# Patient Record
Sex: FEMALE | Race: WHITE | HISPANIC OR LATINO | Employment: STUDENT | URBAN - NONMETROPOLITAN AREA
[De-identification: names, ages, dates, MRNs, and addresses within clinical notes are randomized per-mention and may not be internally consistent; named-entity substitution may affect disease eponyms.]

---
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
## 2021-03-12 ENCOUNTER — OFFICE VISIT (OUTPATIENT)
Dept: OBSTETRICS AND GYNECOLOGY | Facility: CLINIC | Age: 21
End: 2021-03-12

## 2021-03-12 VITALS
SYSTOLIC BLOOD PRESSURE: 128 MMHG | DIASTOLIC BLOOD PRESSURE: 76 MMHG | BODY MASS INDEX: 42.91 KG/M2 | WEIGHT: 267 LBS | HEIGHT: 66 IN

## 2021-03-12 DIAGNOSIS — N60.19 FIBROCYSTIC BREAST DISEASE (FCBD), UNSPECIFIED LATERALITY: ICD-10-CM

## 2021-03-12 DIAGNOSIS — N64.4 PAIN OF BOTH BREASTS: ICD-10-CM

## 2021-03-12 DIAGNOSIS — N63.0 BREAST NODULE: Primary | ICD-10-CM

## 2021-03-12 PROCEDURE — 99214 OFFICE O/P EST MOD 30 MIN: CPT | Performed by: OBSTETRICS & GYNECOLOGY

## 2021-03-12 RX ORDER — LISINOPRIL 5 MG/1
5 TABLET ORAL DAILY
COMMUNITY
Start: 2020-12-28

## 2021-03-12 RX ORDER — MIRTAZAPINE 45 MG/1
45 TABLET, FILM COATED ORAL DAILY
COMMUNITY
Start: 2021-02-22 | End: 2021-09-16

## 2021-03-14 NOTE — PROGRESS NOTES
"Chief Complaint  Breast Problem (Patient complains of pain and lump in left breast x 2 weeks. )     History of Present Illness:  Patient is 21 y.o.  who presents to [facility] for evaluation of a painful lesion in her left breast.  Patient reports this is been present for the last 2 weeks.  Patient reports it has not changed in size.  Patient does report having pain and tenderness in her right breast as well.  Patient denies any discharge or leaking from her nipples.  Patient denies any caffeine intake.  Patient does have chocolate in her diet.  Patient did have similar complaints in 2019.  Patient had a left breast ultrasound at that time.  Patient denies any changes in her health or medications otherwise.  Patient does have Kyleena IUD.    Physical Examination:  Vital Signs: /76   Ht 167.6 cm (66\")   Wt 121 kg (267 lb)   BMI 43.09 kg/m²     General Appearance: alert, appears stated age, and cooperative  Breasts: Examined in supine position  Symmetric with small approximately 1 cm mobile nodule at the 1 o'clock position in the left breast with mild tenderness; no skin dimpling  Nipples normal without inversion, lesions or discharge  There are no palpable axillary nodes  Fibrocystic changes are present both breasts  Abdomen: no masses, no hepatomegaly, no splenomegaly, soft non-tender, no guarding and no rebound tenderness  Pelvic: Not performed.    Data Review:      Labs:    Imaging:  The ultrasound was reviewed and agree with interpretation of test as noted.  US Breast Left Complete (2019 13:02)    Medical Records:  None    Assessment and Plan   Problem List Items Addressed This Visit     None      Visit Diagnoses     Breast nodule    -  Primary  Patient with small nodule of the left breast as noted.  We will schedule breast ultrasound as noted.  Pending results of her breast ultrasound patient may need referral to general surgeon.    Relevant Orders    US " Breast Bilateral Complete    Pain of both breasts      Patient with mastodynia of both breast.  We will schedule bilateral breast ultrasound.  Instructions and precautions are given.  Patient is to call for results.    Relevant Orders    US Breast Bilateral Complete    Fibrocystic breast disease (FCBD), unspecified laterality      I reviewed with Jazzmine that caffeine reduction may help reduce breast pains associated with fibrocystic change.  Furthermore I explained that vitamin E  400 units twice daily may help to lessen the associated breast pains.  If she notices a discretely palpable lump or bloody nipple discharge associated with her breast pain, we need to be notified so that further testing can be scheduled.    Relevant Orders    US Breast Bilateral Complete          Follow Up/Instructions:    Patient was given instructions and counseling regarding her condition or for health maintenance advice. Please see specific information pulled into the AVS if appropriate.     Note: Speech recognition transcription software may have been used to dictate portions of this document.  An attempt at proofreading has been made though minor errors in transcription may still be present.    This note was electronically signed.  Lindsay Muro M.D.

## 2021-03-26 ENCOUNTER — HOSPITAL ENCOUNTER (OUTPATIENT)
Dept: ULTRASOUND IMAGING | Facility: HOSPITAL | Age: 21
Discharge: HOME | End: 2021-03-26
Admitting: OBSTETRICS & GYNECOLOGY

## 2021-03-26 DIAGNOSIS — N64.4 PAIN OF BOTH BREASTS: ICD-10-CM

## 2021-03-26 DIAGNOSIS — N60.19 FIBROCYSTIC BREAST DISEASE (FCBD), UNSPECIFIED LATERALITY: ICD-10-CM

## 2021-03-26 DIAGNOSIS — N63.0 BREAST NODULE: ICD-10-CM

## 2021-03-26 PROCEDURE — 76641 ULTRASOUND BREAST COMPLETE: CPT

## 2021-09-16 ENCOUNTER — OFFICE VISIT (OUTPATIENT)
Dept: PSYCHIATRY | Facility: CLINIC | Age: 21
End: 2021-09-16

## 2021-09-16 VITALS
SYSTOLIC BLOOD PRESSURE: 142 MMHG | HEIGHT: 66 IN | WEIGHT: 250.5 LBS | HEART RATE: 75 BPM | DIASTOLIC BLOOD PRESSURE: 88 MMHG | BODY MASS INDEX: 40.26 KG/M2

## 2021-09-16 DIAGNOSIS — Z91.89 AT HIGH RISK FOR SELF HARM: ICD-10-CM

## 2021-09-16 DIAGNOSIS — F41.1 GAD (GENERALIZED ANXIETY DISORDER): ICD-10-CM

## 2021-09-16 DIAGNOSIS — F33.1 MAJOR DEPRESSIVE DISORDER, RECURRENT EPISODE, MODERATE (HCC): Primary | ICD-10-CM

## 2021-09-16 PROCEDURE — 90792 PSYCH DIAG EVAL W/MED SRVCS: CPT | Performed by: NURSE PRACTITIONER

## 2021-09-16 RX ORDER — CETIRIZINE HYDROCHLORIDE 10 MG/1
10 TABLET ORAL DAILY
COMMUNITY

## 2021-09-16 RX ORDER — VITAMIN E 268 MG
400 CAPSULE ORAL DAILY
COMMUNITY

## 2021-09-16 RX ORDER — BUPROPION HYDROCHLORIDE 150 MG/1
150 TABLET ORAL DAILY
Qty: 30 TABLET | Refills: 1 | Status: SHIPPED | OUTPATIENT
Start: 2021-09-16 | End: 2021-11-15 | Stop reason: SINTOL

## 2021-09-16 RX ORDER — CLONAZEPAM 0.5 MG/1
0.5 TABLET ORAL 2 TIMES DAILY PRN
COMMUNITY
Start: 2021-07-28 | End: 2021-11-15
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
## 2021-11-15 ENCOUNTER — OFFICE VISIT (OUTPATIENT)
Dept: PSYCHIATRY | Facility: CLINIC | Age: 21
End: 2021-11-15

## 2021-11-15 VITALS
DIASTOLIC BLOOD PRESSURE: 84 MMHG | SYSTOLIC BLOOD PRESSURE: 128 MMHG | HEIGHT: 66 IN | WEIGHT: 254 LBS | HEART RATE: 81 BPM | BODY MASS INDEX: 40.82 KG/M2

## 2021-11-15 DIAGNOSIS — Z91.89 AT HIGH RISK FOR SELF HARM: ICD-10-CM

## 2021-11-15 DIAGNOSIS — F39 UNSPECIFIED MOOD (AFFECTIVE) DISORDER (HCC): Primary | ICD-10-CM

## 2021-11-15 DIAGNOSIS — F41.1 GAD (GENERALIZED ANXIETY DISORDER): ICD-10-CM

## 2021-11-15 PROCEDURE — 99214 OFFICE O/P EST MOD 30 MIN: CPT | Performed by: NURSE PRACTITIONER

## 2021-11-15 RX ORDER — LAMOTRIGINE 25 MG/1
TABLET ORAL
Qty: 42 TABLET | Refills: 0 | Status: SHIPPED | OUTPATIENT
Start: 2021-11-15 | End: 2021-12-13

## 2021-11-15 NOTE — PROGRESS NOTES
"Chief Complaint  Unspecified mood, anxiety, and high risk for self harm    Subjective          Jazzmine Green presents to [facility] by herself for a follow up and medication check.    History of Present Illness: Jazzmine states, \"I had thoughts of self-harm a few days of starting Wellbutrin.\"  Jazzmine tells me that she stopped the medication but \"relapsed\" a few days later by cutting her arm.  She reports feeling frustrated at the time.  A few weeks later, Jazzmine tells me that she cut her wrist.  She states, \"it was not bad.\"  She reports having feelings of being numb when she has thoughts of self-harm.  She is more likely to engage in self-harm during her depressive episodes.  She tells me that she has had frequent mood lability within the last several months.  She tells me that, at times, she will feel depressed and hopeless with poor motivation, fatigue, and irritability/anger.  At other times, she tells me she will feel excessive energy, more motivation, decreased need for sleep, and racing thoughts.  She tells me that her moods fluctuate about every 2 weeks.  She states, \"sometimes it is like flipping a switch and then I am fine.\"  She tells me that her mood often stays elevated for anywhere from 1 to 4 days.  She tells me that her coworkers will often noticed when she has an inflated mood.  She reports sleeping about 8 hours nightly.  She continues to work with Cherelle Castellanos on \"self acceptance.\"  She tells me that she feels therapy is beneficial for her mood but she continues to experience anxiety. She tells me she will have \"high anxiety\" followed by feelings of self-doubt which makes her \"angry\" at herself and then she engages in cutting. She tells me it is difficult to consider alternatives to self-harm during these times.  He believes that she has tried Wellbutrin, sertraline, clonazepam, and mirtazapine in the past.  She is not currently taking any psychiatric " "medications as she stopped both Wellbutrin and clonazepam.  She reports having a decreased appetite after having several weeks of \"binge eating.\"  She denies any SI/HI/AVH.    Current Medications:   Current Outpatient Medications   Medication Sig Dispense Refill   • cetirizine (zyrTEC) 10 MG tablet Take 10 mg by mouth Daily.     • levonorgestrel (KYLEENA) 19.5 MG intrauterine device IUD 1 each by Intrauterine route 1 (One) Time.     • lisinopril (PRINIVIL,ZESTRIL) 5 MG tablet Take 5 mg by mouth Daily.     • vitamin E 400 UNIT capsule Take 400 Units by mouth Daily.     • lamoTRIgine (LaMICtal) 25 MG tablet Take 1 tablet by mouth Every Night for 14 days, THEN 2 tablets Every Night for 14 days. 42 tablet 0     No current facility-administered medications for this visit.         Objective   Vital Signs:   /84   Pulse 81   Ht 167.6 cm (66\")   Wt 115 kg (254 lb)   BMI 41.00 kg/m²     Physical Exam  Vitals and nursing note reviewed.   Constitutional:       Appearance: Normal appearance. She is well-developed. She is obese.   Musculoskeletal:         General: Normal range of motion.   Skin:     General: Skin is warm and dry.   Neurological:      Mental Status: She is alert and oriented to person, place, and time.   Psychiatric:         Attention and Perception: Attention normal.         Mood and Affect: Mood normal.         Speech: Speech normal.         Behavior: Behavior normal. Behavior is cooperative.         Thought Content: Thought content normal.         Cognition and Memory: Cognition normal.         Judgment: Judgment normal.        Result Review :                   Assessment and Plan    Problem List Items Addressed This Visit     None      Visit Diagnoses     Unspecified mood (affective) disorder ([facility])    -  Primary    Relevant Medications    lamoTRIgine (LaMICtal) 25 MG tablet          Mental Status Exam:   Hygiene:   good  Cooperation:  Cooperative  Eye Contact:  Good  Psychomotor Behavior:  " Appropriate  Affect:  Appropriate  Mood: normal  Speech:  Normal  Thought Process:  Goal directed and Linear  Thought Content:  Normal  Suicidal:  None  Homicidal:  None  Hallucinations:  None  Delusion:  None  Memory:  Intact  Orientation:  Person, Place, Time and Situation  Reliability:  fair  Insight:  Fair  Judgement:  Fair  Impulse Control:  Fair  Physical/Medical Issues:  Yes HTN     PHQ-9 Score:   PHQ-9 Total Score: 7    Impression/Plan:  -This is a follow-up and medication check.  Jazzmine is endorsing symptoms of mood lability.  She will have depressive episodes about every 2 weeks followed by more elevated moods with decreased need for sleep, excessive energy, and more motivation.  She tells me these days last anywhere from 1 to 4 days.  She has also resumed self harming behaviors.  She has had 2 episodes of self-harm since her last appointment.  She feels the Wellbutrin worsened her thoughts of self-harm; therefore, she stopped the medication.  He and I discussed possible alternatives for treating her depressive and anxious symptoms.  I suggested adding a mood stabilizer as there appears to be some fluctuations in her mood followed by depressive episodes.  She agrees.  In particular, I encouraged her to consider lamotrigine.  I provided education about the mechanism of action and purpose of lamotrigine.  She agrees to trying this medication.  She continues to be in therapy with Cherelle Castellanos.  She will continue to explore alternatives for self-harm.  -.Wellbutrin and clonazepam as patient is no longer taking these medications.  -Initiate lamotrigine 25 mg nightly for 2 weeks and increase to 50 mg nightly for unspecified mood disorder as she appears to have a short duration hypomanic symptoms.  I explained the purpose of this medication to Jazzmine.  We discussed the risk versus benefits of adding this medication to her regiment, as well as potential side effects including rash.  She verbalized  understanding.  -Continue therapy.    MEDS ORDERED DURING VISIT:  New Medications Ordered This Visit   Medications   • lamoTRIgine (LaMICtal) 25 MG tablet     Sig: Take 1 tablet by mouth Every Night for 14 days, THEN 2 tablets Every Night for 14 days.     Dispense:  42 tablet     Refill:  0       I spent 34 minutes caring for Jazzmine on this date of service. This time includes time spent by me in the following activities:preparing for the visit, performing a medically appropriate examination and/or evaluation , counseling and educating the patient/family/caregiver, ordering medications, tests, or procedures, documenting information in the medical record and care coordination  Follow Up   Return in about 4 weeks (around 12/13/2021) for Medication Check.  Patient was given instructions and counseling regarding her condition or for health maintenance advice. Please see specific information pulled into the AVS if appropriate.       TREATMENT PLAN/GOALS: Continue supportive psychotherapy efforts and medications as indicated. Treatment and medication options discussed during today's visit. Patient acknowledged and verbally consented to continue with current treatment plan and was educated on the importance of compliance with treatment and follow-up appointments.    MEDICATION ISSUES:  Discussed medication options and treatment plan of prescribed medication as well as the risks, benefits, and side effects including potential falls, possible impaired driving and metabolic adversities among others. Patient is agreeable to call the office with any worsening of symptoms or onset of side effects. Patient is agreeable to call 911 or go to the nearest ER should he/she begin having SI/HI.        This document has been electronically signed by TONY Dexter, PMHNP-BC  November 15, 2021 13:08 EST    Part of this note may be an electronic transcription/translation of spoken language to printed text using the Dragon  Dictation System.

## 2021-11-24 ENCOUNTER — TELEPHONE (OUTPATIENT)
Dept: PSYCHIATRY | Facility: CLINIC | Age: 21
End: 2021-11-24

## 2021-11-24 NOTE — TELEPHONE ENCOUNTER
SHE DOES FEEL BETTER AFTER NOT TAKING IT LAST NIGHT. SHE THINKS SHE WANTS TO STOP THE LAMICTAL FOR NOW DUE TO THE DIZZINESS. SHE HAS APPOINTMENT ON 12/16/2021 AND I TOLD HER THAT YOU WOULD MAKE CHANGES AT THAT POINT IF YOU DECIDED TO.

## 2021-11-24 NOTE — TELEPHONE ENCOUNTER
THE PATIENT HAS BEEN TAKING LAMICTAL AND FELT REALLY DIZZY HAS BEEN HAPPENING SINCE THE NEXT DAY WHEN SHE FIRST STARTED TAKING IT, SEEMS LIKE IT HAS GOTTEN WORSE SINCE SHE DAY 1 OF TAKING IT. HAVING TO STOP IN THE MIDDLE OF DAY OF WHAT SHE IS DOING TO CATCH HER BREATH AND GET HER SELF TOGETHER. SHE DID NOT TAKE IT LAST NIGHT TO SEE IF SHE WAS NOT DIZZY TODAY. SHE WANTS TO KNOW IF SHE SHOULD STOP OR WILL THIS GO AWAY AND SHOULD SHE CONTINUE. PLEASE ADVISE.

## 2021-11-24 NOTE — TELEPHONE ENCOUNTER
Dizziness is a potential side effect. It is recommended to try and wait it out but I do not want her to be injured from the dizziness. Does she feel better after not taking it last night? I would encourage her to try and continue as she will not see benefits on mood for about two weeks.